# Patient Record
(demographics unavailable — no encounter records)

---

## 2025-02-25 NOTE — IMAGING
[de-identified] : Right shoulder: limited ROM in all planes due to pain +anterior tenderness +impingement testing no gross instability NVI  x-rays right shoulder 2 views: no fracture

## 2025-02-25 NOTE — HISTORY OF PRESENT ILLNESS
[de-identified] : 53 YM with right shoulder pain and limited ROM since this morning.  No injury.  He is a well controlled type II diabetic, A1-C 5.9.   [] : Post Surgical Visit: no [FreeTextEntry1] : Right shoulder [FreeTextEntry3] : 2/25/25 [FreeTextEntry5] : Patient woke up this morning with shoulder pain, no specific injury/trauma. has limited ROM, no prior hx  [de-identified] : movement

## 2025-03-03 NOTE — ASSESSMENT
[FreeTextEntry1] : We reviewed his findings.  With the MDP, the symptoms have mostly resolved.  Naproxen 440mg, q12h for 10-14 days is prescribed, with risk of GI symptoms reviewed. If there are any further symptoms, he will follow up.  Questions answered.    Patient seen by Dr. Jaylon Wright, who determined the assessment and plan. Jenae JIANG participated in the care of the patient, including the history and physical exam. Emy SIMMS, am scribing for Dr. Jaylon Wright in his presence for the chief complaint, physical exam, studies, assessment, and/or plan.

## 2025-03-03 NOTE — PHYSICAL EXAM
[Right] : right shoulder [Sitting] : sitting [5 ___] : forward flexion 5[unfilled]/5 [5___] : external rotation 5[unfilled]/5 [] : no sensory deficits [FreeTextEntry9] : IR to T12. [TWNoteComboBox4] : passive forward flexion 165 degrees [de-identified] : external rotation 75 degrees

## 2025-03-03 NOTE — REASON FOR VISIT
[FreeTextEntry2] : NEW CONDITION 3/3/25 This is a 53 year old RHD M  with right shoulder pain that started without injury on 2/25/25.  No history.  He saw Dar BOLES at [facility] and had XRs.  He completed a MDP this morning.  Reaching was very painful, though now he has no pain or weakness.  He is sleeping fine.  No numbness.

## 2025-03-03 NOTE — DATA REVIEWED
[FreeTextEntry1] : SHOULDER XR RIGHT (AP/Outlet) taken on 2/25/25 and reviewed on 3/3/25: The clinically significant findings include a minor AC changes and no GH arthritis.   SCAPULA XR RIGHT (Axillary/Zanca) taken and reviewed on 3/3/25: The clinically significant findings include Type II acromion.

## 2025-03-03 NOTE — HISTORY OF PRESENT ILLNESS
[8] : 8 [2] : 2 [Dull/Aching] : dull/aching [Sharp] : sharp [Frequent] : frequent [Household chores] : household chores [Rest] : rest [Exercising] : exercising [Full time] : Work status: full time [] : Post Surgical Visit: no [de-identified] : xrays- 2/25/25, Priyanka   [FreeTextEntry1] : right shoulder  [de-identified] :